# Patient Record
Sex: MALE | HISPANIC OR LATINO | Employment: FULL TIME | URBAN - METROPOLITAN AREA
[De-identification: names, ages, dates, MRNs, and addresses within clinical notes are randomized per-mention and may not be internally consistent; named-entity substitution may affect disease eponyms.]

---

## 2024-01-31 ENCOUNTER — APPOINTMENT (OUTPATIENT)
Dept: RADIOLOGY | Facility: MEDICAL CENTER | Age: 29
End: 2024-01-31
Attending: EMERGENCY MEDICINE

## 2024-01-31 ENCOUNTER — HOSPITAL ENCOUNTER (EMERGENCY)
Facility: MEDICAL CENTER | Age: 29
End: 2024-01-31
Attending: EMERGENCY MEDICINE

## 2024-01-31 VITALS
HEART RATE: 86 BPM | SYSTOLIC BLOOD PRESSURE: 162 MMHG | BODY MASS INDEX: 46.95 KG/M2 | WEIGHT: 264.99 LBS | HEIGHT: 63 IN | DIASTOLIC BLOOD PRESSURE: 85 MMHG | RESPIRATION RATE: 20 BRPM | TEMPERATURE: 99.1 F | OXYGEN SATURATION: 93 %

## 2024-01-31 DIAGNOSIS — K57.32 SIGMOID DIVERTICULITIS: ICD-10-CM

## 2024-01-31 LAB
ALBUMIN SERPL BCP-MCNC: 4.2 G/DL (ref 3.2–4.9)
ALBUMIN/GLOB SERPL: 1 G/DL
ALP SERPL-CCNC: 92 U/L (ref 30–99)
ALT SERPL-CCNC: 26 U/L (ref 2–50)
ANION GAP SERPL CALC-SCNC: 13 MMOL/L (ref 7–16)
APPEARANCE UR: CLEAR
AST SERPL-CCNC: 19 U/L (ref 12–45)
BACTERIA #/AREA URNS HPF: NEGATIVE /HPF
BASOPHILS # BLD AUTO: 0.3 % (ref 0–1.8)
BASOPHILS # BLD: 0.05 K/UL (ref 0–0.12)
BILIRUB SERPL-MCNC: 1 MG/DL (ref 0.1–1.5)
BILIRUB UR QL STRIP.AUTO: NEGATIVE
BUN SERPL-MCNC: 7 MG/DL (ref 8–22)
CALCIUM ALBUM COR SERPL-MCNC: 9.1 MG/DL (ref 8.5–10.5)
CALCIUM SERPL-MCNC: 9.3 MG/DL (ref 8.5–10.5)
CHLORIDE SERPL-SCNC: 102 MMOL/L (ref 96–112)
CO2 SERPL-SCNC: 21 MMOL/L (ref 20–33)
COLOR UR: ABNORMAL
CREAT SERPL-MCNC: 0.66 MG/DL (ref 0.5–1.4)
EOSINOPHIL # BLD AUTO: 0.01 K/UL (ref 0–0.51)
EOSINOPHIL NFR BLD: 0.1 % (ref 0–6.9)
EPI CELLS #/AREA URNS HPF: NEGATIVE /HPF
ERYTHROCYTE [DISTWIDTH] IN BLOOD BY AUTOMATED COUNT: 43.8 FL (ref 35.9–50)
GFR SERPLBLD CREATININE-BSD FMLA CKD-EPI: 129 ML/MIN/1.73 M 2
GLOBULIN SER CALC-MCNC: 4.2 G/DL (ref 1.9–3.5)
GLUCOSE SERPL-MCNC: 109 MG/DL (ref 65–99)
GLUCOSE UR STRIP.AUTO-MCNC: NEGATIVE MG/DL
HCT VFR BLD AUTO: 51.9 % (ref 42–52)
HGB BLD-MCNC: 17.9 G/DL (ref 14–18)
HYALINE CASTS #/AREA URNS LPF: ABNORMAL /LPF
IMM GRANULOCYTES # BLD AUTO: 0.13 K/UL (ref 0–0.11)
IMM GRANULOCYTES NFR BLD AUTO: 0.7 % (ref 0–0.9)
KETONES UR STRIP.AUTO-MCNC: 40 MG/DL
LEUKOCYTE ESTERASE UR QL STRIP.AUTO: NEGATIVE
LIPASE SERPL-CCNC: 14 U/L (ref 11–82)
LYMPHOCYTES # BLD AUTO: 1.26 K/UL (ref 1–4.8)
LYMPHOCYTES NFR BLD: 6.8 % (ref 22–41)
MCH RBC QN AUTO: 30.8 PG (ref 27–33)
MCHC RBC AUTO-ENTMCNC: 34.5 G/DL (ref 32.3–36.5)
MCV RBC AUTO: 89.2 FL (ref 81.4–97.8)
MICRO URNS: ABNORMAL
MONOCYTES # BLD AUTO: 1.4 K/UL (ref 0–0.85)
MONOCYTES NFR BLD AUTO: 7.6 % (ref 0–13.4)
NEUTROPHILS # BLD AUTO: 15.69 K/UL (ref 1.82–7.42)
NEUTROPHILS NFR BLD: 84.5 % (ref 44–72)
NITRITE UR QL STRIP.AUTO: NEGATIVE
NRBC # BLD AUTO: 0 K/UL
NRBC BLD-RTO: 0 /100 WBC (ref 0–0.2)
PH UR STRIP.AUTO: 6.5 [PH] (ref 5–8)
PLATELET # BLD AUTO: 297 K/UL (ref 164–446)
PMV BLD AUTO: 9.2 FL (ref 9–12.9)
POTASSIUM SERPL-SCNC: 3.8 MMOL/L (ref 3.6–5.5)
PROT SERPL-MCNC: 8.4 G/DL (ref 6–8.2)
PROT UR QL STRIP: NEGATIVE MG/DL
RBC # BLD AUTO: 5.82 M/UL (ref 4.7–6.1)
RBC # URNS HPF: ABNORMAL /HPF
RBC UR QL AUTO: ABNORMAL
SODIUM SERPL-SCNC: 136 MMOL/L (ref 135–145)
SP GR UR STRIP.AUTO: 1.02
UROBILINOGEN UR STRIP.AUTO-MCNC: 1 MG/DL
WBC # BLD AUTO: 18.5 K/UL (ref 4.8–10.8)
WBC #/AREA URNS HPF: ABNORMAL /HPF

## 2024-01-31 PROCEDURE — 80053 COMPREHEN METABOLIC PANEL: CPT

## 2024-01-31 PROCEDURE — 700117 HCHG RX CONTRAST REV CODE 255: Performed by: EMERGENCY MEDICINE

## 2024-01-31 PROCEDURE — 700102 HCHG RX REV CODE 250 W/ 637 OVERRIDE(OP): Performed by: EMERGENCY MEDICINE

## 2024-01-31 PROCEDURE — 99285 EMERGENCY DEPT VISIT HI MDM: CPT

## 2024-01-31 PROCEDURE — 81001 URINALYSIS AUTO W/SCOPE: CPT

## 2024-01-31 PROCEDURE — 83690 ASSAY OF LIPASE: CPT

## 2024-01-31 PROCEDURE — A9270 NON-COVERED ITEM OR SERVICE: HCPCS | Performed by: EMERGENCY MEDICINE

## 2024-01-31 PROCEDURE — 85025 COMPLETE CBC W/AUTO DIFF WBC: CPT

## 2024-01-31 PROCEDURE — 700111 HCHG RX REV CODE 636 W/ 250 OVERRIDE (IP): Mod: JZ | Performed by: EMERGENCY MEDICINE

## 2024-01-31 PROCEDURE — 96374 THER/PROPH/DIAG INJ IV PUSH: CPT

## 2024-01-31 PROCEDURE — 96375 TX/PRO/DX INJ NEW DRUG ADDON: CPT

## 2024-01-31 PROCEDURE — 74177 CT ABD & PELVIS W/CONTRAST: CPT

## 2024-01-31 RX ORDER — MORPHINE SULFATE 4 MG/ML
4 INJECTION INTRAVENOUS ONCE
Status: COMPLETED | OUTPATIENT
Start: 2024-01-31 | End: 2024-01-31

## 2024-01-31 RX ORDER — MORPHINE SULFATE 15 MG/1
7.5 TABLET ORAL EVERY 6 HOURS PRN
Qty: 6 TABLET | Refills: 0 | Status: SHIPPED | OUTPATIENT
Start: 2024-01-31 | End: 2024-02-03

## 2024-01-31 RX ORDER — HYDROCODONE BITARTRATE AND ACETAMINOPHEN 5; 325 MG/1; MG/1
1 TABLET ORAL ONCE
Status: COMPLETED | OUTPATIENT
Start: 2024-01-31 | End: 2024-01-31

## 2024-01-31 RX ORDER — DOCUSATE SODIUM 100 MG/1
100 CAPSULE, LIQUID FILLED ORAL 2 TIMES DAILY
Qty: 60 CAPSULE | Refills: 0 | Status: SHIPPED | OUTPATIENT
Start: 2024-01-31

## 2024-01-31 RX ORDER — AMOXICILLIN AND CLAVULANATE POTASSIUM 875; 125 MG/1; MG/1
1 TABLET, FILM COATED ORAL ONCE
Status: COMPLETED | OUTPATIENT
Start: 2024-01-31 | End: 2024-01-31

## 2024-01-31 RX ORDER — ONDANSETRON 2 MG/ML
4 INJECTION INTRAMUSCULAR; INTRAVENOUS ONCE
Status: COMPLETED | OUTPATIENT
Start: 2024-01-31 | End: 2024-01-31

## 2024-01-31 RX ORDER — AMOXICILLIN AND CLAVULANATE POTASSIUM 875; 125 MG/1; MG/1
1 TABLET, FILM COATED ORAL 2 TIMES DAILY
Qty: 14 TABLET | Refills: 0 | Status: ACTIVE | OUTPATIENT
Start: 2024-01-31 | End: 2024-02-07

## 2024-01-31 RX ADMIN — ONDANSETRON 4 MG: 2 INJECTION INTRAMUSCULAR; INTRAVENOUS at 20:41

## 2024-01-31 RX ADMIN — IOHEXOL 100 ML: 350 INJECTION, SOLUTION INTRAVENOUS at 21:21

## 2024-01-31 RX ADMIN — MORPHINE SULFATE 4 MG: 4 INJECTION, SOLUTION INTRAMUSCULAR; INTRAVENOUS at 20:42

## 2024-01-31 RX ADMIN — AMOXICILLIN AND CLAVULANATE POTASSIUM 1 TABLET: 875; 125 TABLET, FILM COATED ORAL at 22:09

## 2024-01-31 RX ADMIN — HYDROCODONE BITARTRATE AND ACETAMINOPHEN 1 TABLET: 5; 325 TABLET ORAL at 22:08

## 2024-02-01 NOTE — ED TRIAGE NOTES
Chief Complaint   Patient presents with    Abdominal Pain     Reports pain from umbilicus radiating down to left groin since yesterday. States progressively worsening. Denies redness or swelling. Reports pain with urination. Denies blood in urine.      Pt ambulates to triage for above complaints.     Pt in nad, VSS. Protocol initiated. Pt to anthony, educated on triage process, thanked for patience.

## 2024-02-01 NOTE — ED NOTES
"Pt discharged home with friend. The pt is alert and oriented, calm and cooperative, talks with clear coherent speech, ambulates with a steady gait, and moves extremities. The pt reports a pain reduction. Vitals stable on the monitor. IV discontinued and gauze placed, pt in possession of belongings. Pt provided discharge education and information pertaining to medications and follow up appointments. Pt received copy of discharge instructions and verbalized understanding. BP (!) 162/85   Pulse 86   Temp 37.3 °C (99.1 °F) (Temporal)   Resp 20   Ht 1.6 m (5' 3\")   Wt 120 kg (264 lb 15.9 oz)   SpO2 93%   BMI 46.94 kg/m²     "

## 2024-02-01 NOTE — DISCHARGE INSTRUCTIONS
Do not drive, operate any machinery, or partake in dangerous activities that require maximum physical and mental performance while taking the prescribed pain killer. Do not combine this drug with alcohol or other sedatives or narcotics. Follow up with your primary care doctor in regards to the future management of this medication.

## 2024-02-01 NOTE — ED PROVIDER NOTES
"ED Provider Note    CHIEF COMPLAINT  Chief Complaint   Patient presents with    Abdominal Pain     Reports pain from umbilicus radiating down to left groin since yesterday. States progressively worsening. Denies redness or swelling. Reports pain with urination. Denies blood in urine.          HPI/ROS  LIMITATION TO HISTORY   Select: : None  OUTSIDE HISTORIAN(S):  cipriano    Gucci Bucio is a 28 y.o. male who presents to the emerged department chief complaint of 2 to 3 days of worsening abdominal pain.  He states it kind of started out as a mild ache in his suprapubic region and pain when he peed radiating to his testicles but now its part is really just worsened in his abdominal area.  He states that still kind of radiates to his penis in his testicles but mostly is abdominal discomfort.  Associated with nausea and chills but no fevers no actual vomiting.  A little bit of constipation but no bloody stools or bloody emesis.  No actual dysuria it just hurts more in his belly but no penile pain no testicular swelling that he has noted. he is not sexually active    PAST MEDICAL HISTORY       SURGICAL HISTORY  patient denies any surgical history    FAMILY HISTORY  History reviewed. No pertinent family history.    SOCIAL HISTORY  Social History     Tobacco Use    Smoking status: Never    Smokeless tobacco: Never   Substance and Sexual Activity    Alcohol use: Yes     Comment: occ    Drug use: Yes     Types: Inhaled     Comment: marijuana    Sexual activity: Not on file       CURRENT MEDICATIONS  Home Medications       Reviewed by Ave Villanueva R.N. (Registered Nurse) on 01/31/24 at 1753  Med List Status: Partial     Medication Last Dose Status        Patient Nicolás Taking any Medications                           ALLERGIES  No Known Allergies    PHYSICAL EXAM  VITAL SIGNS: BP (!) 151/93   Pulse 100   Temp 35.9 °C (96.6 °F) (Temporal)   Resp 16   Ht 1.6 m (5' 3\")   Wt 120 kg (264 lb 15.9 oz)   SpO2 97%   BMI " 46.94 kg/m²    Pulse OX: Pulse Oxygen level is normal  Constitutional: Alert in no apparent distress.  Base male  HENT: Normocephalic, Atraumatic, MMM  Eyes: PERound. Conjunctiva normal, non-icteric.   Heart: Regular rate and rhythm, intact distal pulses   Lungs: Symmetrical movement, no resp distress   Abdomen: Significant tenderness suprapubic and right lower quadrant with localized guarding non-distended, normal bowel sounds  : Uncircumcised male foreskin is retracted easily no phimosis no paraphimosis no erythema no warmth no rash bilateral descended testicles without swelling edema or tenderness  Skin: Warm, Dry, No erythema, No rash.   Neurologic: Alert and oriented, Grossly non-focal.       DIAGNOSTIC STUDIES / PROCEDURES    LABS  Labs Reviewed   CBC WITH DIFFERENTIAL - Abnormal; Notable for the following components:       Result Value    WBC 18.5 (*)     Neutrophils-Polys 84.50 (*)     Lymphocytes 6.80 (*)     Neutrophils (Absolute) 15.69 (*)     Monos (Absolute) 1.40 (*)     Immature Granulocytes (abs) 0.13 (*)     All other components within normal limits   COMP METABOLIC PANEL - Abnormal; Notable for the following components:    Glucose 109 (*)     Bun 7 (*)     Total Protein 8.4 (*)     Globulin 4.2 (*)     All other components within normal limits   URINALYSIS - Abnormal; Notable for the following components:    Ketones 40 (*)     Occult Blood Trace (*)     All other components within normal limits   URINE MICROSCOPIC (W/UA) - Abnormal; Notable for the following components:    WBC 0-2 (*)     RBC 5-10 (*)     All other components within normal limits   LIPASE   ESTIMATED GFR         RADIOLOGY  I have independently interpreted the diagnostic imaging associated with this visit and am waiting the final reading from the radiologist.   My preliminary interpretation is as follows:     Ct abd pelvis -no evidence appendicitis but appears to be diverticulitis without free air or free fluid or  "abscess    Radiologist interpretation:     CT-ABDOMEN-PELVIS WITH   Final Result      Acute sigmoid diverticulitis without gross perforation.            COURSE & MEDICAL DECISION MAKING    ED Observation Status? No; Patient does not meet criteria for ED Observation.     INITIAL ASSESSMENT, COURSE AND PLAN  Care Narrative:     Patient is a 28-year-old male presents emerged apartment with was was complaining of testicular and penile pain which actually turns out to be abdominal pain and localized guarding in the abdomen with worsening pain over the last 2 days and nausea with decreased appetite concerning for appendicitis versus atypical diverticulitis.  I low concern for UTI a urinalysis was obtained and only had trace hematuria I doubt pyelonephritis I doubt renal stone.  His physical examination of his  was unremarkable.  The plan is for CT of the abdomen pelvis with pain management morphine was provided as well as Zofran      DISPOSITION AND DISCUSSIONS  9:36 PM  I reassessed patient at the bedside.  We discussed his diagnosis of diverticulitis in the sigmoid region without perforation or abscess.  He will be sent home on oral pain meds as well as oral antibiotics.  Was given the first dose of antibiotics here in the ED.  Gentle liquid diet for the next 2 days and strict return precautions for any new or worsening issues including vomiting and inability to tolerate oral antibiotics    BP (!) 162/85   Pulse 86   Temp 37.3 °C (99.1 °F) (Temporal)   Resp 20   Ht 1.6 m (5' 3\")   Wt 120 kg (264 lb 15.9 oz)   SpO2 93%   BMI 46.94 kg/m²     In prescribing controlled substances to this patient, I certify that I have obtained and reviewed the medical history of Gucci Bucio. I have also made a good diane effort to obtain applicable records from other providers who have treated the patient and records did not demonstrate any increased risk of substance abuse that would prevent me from prescribing " controlled substances.     I have conducted a physical exam and documented it. I have reviewed Mr. Frances Bucio’s prescription history as maintained by the [place] Prescription Monitoring Program.     I have assessed the patient’s risk for abuse, dependency, and addiction using the validated Opioid Risk Tool available at https://www.mdcalc.com/bfddzr-htfg-oovs-ort-narcotic-abuse. 0    Given the above, I believe the benefits of controlled substance therapy outweigh the risks. The reasons for prescribing controlled substances include non-narcotic, oral analgesic alternatives have been inadequate for pain control. Accordingly, I have discussed the risk and benefits, treatment plan, and alternative therapies with the patient.       I have discussed management of the patient with the following physicians and ADEBAYO's:  none    Discussion of management with other [facility] or appropriate source(s): None     Escalation of care considered, and ultimately not performed:acute inpatient care management, however at this time, the patient is most appropriate for outpatient management    Barriers to care at this time, including but not limited to:  na .     Decision tools and prescription drugs considered including, but not limited to: Antibiotics were prescribed and Pain Medications were prescribed .    The patient will return for new or worsening symptoms and is stable at the time of discharge.    The patient is referred to a primary physician for blood pressure management, diabetic screening, and for all other preventative health concerns.    DISPOSITION:  Patient will be discharged home in stable condition.    FOLLOW UP:  [facility], Emergency Dept  [address] [address] [phone]    If symptoms worsen      OUTPATIENT MEDICATIONS:  Discharge Medication List as of 1/31/2024 10:25 PM        START taking these medications    Details   amoxicillin-clavulanate (AUGMENTIN) 875-125 MG Tab Take 1  Tablet by mouth 2 times a day for 7 days., Disp-14 Tablet, R-0, Normal      morphine (MS IR) 15 MG tablet Take 0.5 Tablets by mouth every 6 hours as needed for Severe Pain for up to 3 days., Disp-6 Tablet, R-0, Normal      docusate sodium (COLACE) 100 MG Cap Take 1 Capsule by mouth 2 times a day., Disp-60 Capsule, R-0, Normal               FINAL DIAGNOSIS  1. Sigmoid diverticulitis           Electronically signed by: Eloina Verdugo M.D., 1/31/2024 8:13 PM

## 2024-02-01 NOTE — ED NOTES
The pt is alert and oriented sitting in bed. The pt reports pain reduction after medicating per mar. Vitals stable on the monitor

## 2024-02-01 NOTE — ED NOTES
The pt is alert and oriented. The pt ambulated with a steady gait to the room. The pt reports pain. The pt changed and RN hooked up the pt to the monitor.

## 2024-11-29 ENCOUNTER — APPOINTMENT (OUTPATIENT)
Dept: RADIOLOGY | Facility: MEDICAL CENTER | Age: 29
End: 2024-11-29
Attending: EMERGENCY MEDICINE

## 2024-11-29 ENCOUNTER — HOSPITAL ENCOUNTER (EMERGENCY)
Facility: MEDICAL CENTER | Age: 29
End: 2024-11-30
Attending: EMERGENCY MEDICINE

## 2024-11-29 VITALS
OXYGEN SATURATION: 96 % | RESPIRATION RATE: 16 BRPM | WEIGHT: 227.07 LBS | BODY MASS INDEX: 38.77 KG/M2 | HEART RATE: 65 BPM | DIASTOLIC BLOOD PRESSURE: 70 MMHG | HEIGHT: 64 IN | SYSTOLIC BLOOD PRESSURE: 157 MMHG | TEMPERATURE: 97.4 F

## 2024-11-29 DIAGNOSIS — N20.1 URETEROLITHIASIS: ICD-10-CM

## 2024-11-29 DIAGNOSIS — R10.9 FLANK PAIN: ICD-10-CM

## 2024-11-29 LAB
ALBUMIN SERPL BCP-MCNC: 4.6 G/DL (ref 3.2–4.9)
ALBUMIN/GLOB SERPL: 1.4 G/DL
ALP SERPL-CCNC: 81 U/L (ref 30–99)
ALT SERPL-CCNC: 17 U/L (ref 2–50)
ANION GAP SERPL CALC-SCNC: 17 MMOL/L (ref 7–16)
APPEARANCE UR: ABNORMAL
AST SERPL-CCNC: 20 U/L (ref 12–45)
BACTERIA #/AREA URNS HPF: ABNORMAL /HPF
BASOPHILS # BLD AUTO: 0.5 % (ref 0–1.8)
BASOPHILS # BLD: 0.07 K/UL (ref 0–0.12)
BILIRUB SERPL-MCNC: 0.5 MG/DL (ref 0.1–1.5)
BILIRUB UR QL STRIP.AUTO: NEGATIVE
BUN SERPL-MCNC: 13 MG/DL (ref 8–22)
CALCIUM ALBUM COR SERPL-MCNC: 8.9 MG/DL (ref 8.5–10.5)
CALCIUM SERPL-MCNC: 9.4 MG/DL (ref 8.5–10.5)
CASTS URNS QL MICRO: ABNORMAL /LPF (ref 0–2)
CHLORIDE SERPL-SCNC: 101 MMOL/L (ref 96–112)
CO2 SERPL-SCNC: 21 MMOL/L (ref 20–33)
COLOR UR: ABNORMAL
CREAT SERPL-MCNC: 0.93 MG/DL (ref 0.5–1.4)
EOSINOPHIL # BLD AUTO: 0.18 K/UL (ref 0–0.51)
EOSINOPHIL NFR BLD: 1.3 % (ref 0–6.9)
EPITHELIAL CELLS 1715: ABNORMAL /HPF (ref 0–5)
ERYTHROCYTE [DISTWIDTH] IN BLOOD BY AUTOMATED COUNT: 42.6 FL (ref 35.9–50)
GFR SERPLBLD CREATININE-BSD FMLA CKD-EPI: 112 ML/MIN/1.73 M 2
GLOBULIN SER CALC-MCNC: 3.4 G/DL (ref 1.9–3.5)
GLUCOSE SERPL-MCNC: 130 MG/DL (ref 65–99)
GLUCOSE UR STRIP.AUTO-MCNC: NEGATIVE MG/DL
HCT VFR BLD AUTO: 49.3 % (ref 42–52)
HGB BLD-MCNC: 16.9 G/DL (ref 14–18)
HYALINE CAST   1831: PRESENT /LPF
IMM GRANULOCYTES # BLD AUTO: 0.1 K/UL (ref 0–0.11)
IMM GRANULOCYTES NFR BLD AUTO: 0.7 % (ref 0–0.9)
KETONES UR STRIP.AUTO-MCNC: 15 MG/DL
LEUKOCYTE ESTERASE UR QL STRIP.AUTO: ABNORMAL
LIPASE SERPL-CCNC: 23 U/L (ref 11–82)
LYMPHOCYTES # BLD AUTO: 1.22 K/UL (ref 1–4.8)
LYMPHOCYTES NFR BLD: 8.6 % (ref 22–41)
MCH RBC QN AUTO: 29.9 PG (ref 27–33)
MCHC RBC AUTO-ENTMCNC: 34.3 G/DL (ref 32.3–36.5)
MCV RBC AUTO: 87.3 FL (ref 81.4–97.8)
MICRO URNS: ABNORMAL
MONOCYTES # BLD AUTO: 0.8 K/UL (ref 0–0.85)
MONOCYTES NFR BLD AUTO: 5.7 % (ref 0–13.4)
NEUTROPHILS # BLD AUTO: 11.74 K/UL (ref 1.82–7.42)
NEUTROPHILS NFR BLD: 83.2 % (ref 44–72)
NITRITE UR QL STRIP.AUTO: NEGATIVE
NRBC # BLD AUTO: 0 K/UL
NRBC BLD-RTO: 0 /100 WBC (ref 0–0.2)
PH UR STRIP.AUTO: 6 [PH] (ref 5–8)
PLATELET # BLD AUTO: 325 K/UL (ref 164–446)
PMV BLD AUTO: 9.2 FL (ref 9–12.9)
POTASSIUM SERPL-SCNC: 3.8 MMOL/L (ref 3.6–5.5)
PROT SERPL-MCNC: 8 G/DL (ref 6–8.2)
PROT UR QL STRIP: 100 MG/DL
RBC # BLD AUTO: 5.65 M/UL (ref 4.7–6.1)
RBC # URNS HPF: >100 /HPF (ref 0–2)
RBC UR QL AUTO: ABNORMAL
SODIUM SERPL-SCNC: 139 MMOL/L (ref 135–145)
SP GR UR STRIP.AUTO: 1.03
UROBILINOGEN UR STRIP.AUTO-MCNC: 1 EU/DL
WBC # BLD AUTO: 14.1 K/UL (ref 4.8–10.8)
WBC #/AREA URNS HPF: ABNORMAL /HPF

## 2024-11-29 PROCEDURE — 36415 COLL VENOUS BLD VENIPUNCTURE: CPT

## 2024-11-29 PROCEDURE — RXMED WILLOW AMBULATORY MEDICATION CHARGE: Performed by: EMERGENCY MEDICINE

## 2024-11-29 PROCEDURE — 83690 ASSAY OF LIPASE: CPT

## 2024-11-29 PROCEDURE — 700102 HCHG RX REV CODE 250 W/ 637 OVERRIDE(OP): Performed by: EMERGENCY MEDICINE

## 2024-11-29 PROCEDURE — 99284 EMERGENCY DEPT VISIT MOD MDM: CPT

## 2024-11-29 PROCEDURE — A9270 NON-COVERED ITEM OR SERVICE: HCPCS | Performed by: EMERGENCY MEDICINE

## 2024-11-29 PROCEDURE — 96374 THER/PROPH/DIAG INJ IV PUSH: CPT

## 2024-11-29 PROCEDURE — 81001 URINALYSIS AUTO W/SCOPE: CPT

## 2024-11-29 PROCEDURE — 74176 CT ABD & PELVIS W/O CONTRAST: CPT

## 2024-11-29 PROCEDURE — 96375 TX/PRO/DX INJ NEW DRUG ADDON: CPT

## 2024-11-29 PROCEDURE — 85025 COMPLETE CBC W/AUTO DIFF WBC: CPT

## 2024-11-29 PROCEDURE — 700111 HCHG RX REV CODE 636 W/ 250 OVERRIDE (IP): Mod: JZ | Performed by: EMERGENCY MEDICINE

## 2024-11-29 PROCEDURE — 80053 COMPREHEN METABOLIC PANEL: CPT

## 2024-11-29 RX ORDER — MORPHINE SULFATE 4 MG/ML
4 INJECTION INTRAVENOUS ONCE
Status: COMPLETED | OUTPATIENT
Start: 2024-11-29 | End: 2024-11-29

## 2024-11-29 RX ORDER — ACETAMINOPHEN 500 MG
1000 TABLET ORAL ONCE
Status: COMPLETED | OUTPATIENT
Start: 2024-11-30 | End: 2024-11-29

## 2024-11-29 RX ORDER — KETOROLAC TROMETHAMINE 15 MG/ML
15 INJECTION, SOLUTION INTRAMUSCULAR; INTRAVENOUS ONCE
Status: COMPLETED | OUTPATIENT
Start: 2024-11-30 | End: 2024-11-29

## 2024-11-29 RX ORDER — ONDANSETRON 4 MG/1
4 TABLET, ORALLY DISINTEGRATING ORAL EVERY 6 HOURS PRN
Qty: 15 TABLET | Refills: 0 | Status: SHIPPED | OUTPATIENT
Start: 2024-11-29 | End: 2024-12-06

## 2024-11-29 RX ORDER — ACETAMINOPHEN 500 MG
1000 TABLET ORAL EVERY 6 HOURS PRN
Qty: 40 TABLET | Refills: 0 | Status: SHIPPED | OUTPATIENT
Start: 2024-11-29 | End: 2024-12-09

## 2024-11-29 RX ORDER — IBUPROFEN 600 MG/1
600 TABLET, FILM COATED ORAL EVERY 6 HOURS PRN
Qty: 40 TABLET | Refills: 0 | Status: SHIPPED | OUTPATIENT
Start: 2024-11-29 | End: 2024-12-10

## 2024-11-29 RX ORDER — ONDANSETRON 2 MG/ML
4 INJECTION INTRAMUSCULAR; INTRAVENOUS ONCE
Status: COMPLETED | OUTPATIENT
Start: 2024-11-29 | End: 2024-11-29

## 2024-11-29 RX ORDER — TAMSULOSIN HYDROCHLORIDE 0.4 MG/1
0.4 CAPSULE ORAL DAILY
Qty: 10 CAPSULE | Refills: 0 | Status: SHIPPED | OUTPATIENT
Start: 2024-11-29 | End: 2024-12-10

## 2024-11-29 RX ORDER — OXYCODONE HYDROCHLORIDE 5 MG/1
5 TABLET ORAL EVERY 6 HOURS PRN
Qty: 12 TABLET | Refills: 0 | Status: SHIPPED | OUTPATIENT
Start: 2024-11-29 | End: 2024-12-07

## 2024-11-29 RX ORDER — TAMSULOSIN HYDROCHLORIDE 0.4 MG/1
0.4 CAPSULE ORAL ONCE
Status: COMPLETED | OUTPATIENT
Start: 2024-11-30 | End: 2024-11-29

## 2024-11-29 RX ADMIN — MORPHINE SULFATE 4 MG: 4 INJECTION, SOLUTION INTRAMUSCULAR; INTRAVENOUS at 22:12

## 2024-11-29 RX ADMIN — ONDANSETRON 4 MG: 2 INJECTION INTRAMUSCULAR; INTRAVENOUS at 22:13

## 2024-11-29 RX ADMIN — KETOROLAC TROMETHAMINE 15 MG: 15 INJECTION, SOLUTION INTRAMUSCULAR; INTRAVENOUS at 23:42

## 2024-11-29 RX ADMIN — TAMSULOSIN HYDROCHLORIDE 0.4 MG: 0.4 CAPSULE ORAL at 23:45

## 2024-11-29 RX ADMIN — ACETAMINOPHEN 1000 MG: 500 TABLET ORAL at 23:44

## 2024-11-29 ASSESSMENT — FIBROSIS 4 INDEX: FIB4 SCORE: 0.39

## 2024-11-29 ASSESSMENT — PAIN DESCRIPTION - PAIN TYPE
TYPE: ACUTE PAIN

## 2024-11-30 ENCOUNTER — PHARMACY VISIT (OUTPATIENT)
Dept: PHARMACY | Facility: MEDICAL CENTER | Age: 29
End: 2024-11-30
Payer: COMMERCIAL

## 2024-11-30 PROCEDURE — 99284 EMERGENCY DEPT VISIT MOD MDM: CPT

## 2024-11-30 PROCEDURE — 36415 COLL VENOUS BLD VENIPUNCTURE: CPT

## 2024-11-30 NOTE — ED NOTES
PIV removed, catheter intact. Discharge education provided by ERP. Discharge paperwork reviewed with pt. Controlled substance use information consent signed by pt. Prescription to be picked up by pt. All questions answered. All belongings with pt. Pt ambulated to lobby unassisted with steady gait.   41

## 2024-11-30 NOTE — ED TRIAGE NOTES
Gucci Bucio  29 y.o. male  Chief Complaint   Patient presents with    Flank Pain     Started about 1.5 hrs ago while watching TV, denies recent trauma, dysuria.      Pt ambulatory to triage with steady gait for above complaint.     Pt is GCS 15, speaking in full sentences, follows commands and responds appropriately to questions. Resp are even and unlabored.     Pt placed in ED lobby. Pt educated on triage process. Pt encouraged to alert staff for any changes.       Vitals:    11/29/24 2123   BP: (!) 155/88   Pulse: (!) 58   Resp: 13   Temp: 36.4 °C (97.6 °F)   SpO2: 100%

## 2024-11-30 NOTE — ED PROVIDER NOTES
"ED Provider Note    CHIEF COMPLAINT  Chief Complaint   Patient presents with    Flank Pain     Started about 1.5 hrs ago while watching TV, denies recent trauma, dysuria.        EXTERNAL RECORDS REVIEWED  Other ED records reviewed: Patient was last seen in the emergency department January 2020 for with abdominal pain, found to have diverticulitis without complication.  He was discharged on Augmentin.    HPI/ROS  LIMITATION TO HISTORY   Select: : None  OUTSIDE HISTORIAN(S):  None    LuisKang Bucio is a 29 y.o. male with history of diverticulitis who presents to the emergency department for evaluation of sudden onset of right flank pain.  Since pain onset, he has also had nausea and a few episodes of nonbilious/nonbloody vomiting.  No diarrhea, constipation, melena, bloody stools.  No dysuria or hematuria.  No prior abdominal surgeries.  No history of kidney stones.  No fevers or chills.    PAST MEDICAL HISTORY   Diverticulitis    SURGICAL HISTORY  patient denies any surgical history    FAMILY HISTORY  No family history on file.    SOCIAL HISTORY  Social History     Tobacco Use    Smoking status: Never    Smokeless tobacco: Never   Substance and Sexual Activity    Alcohol use: Yes     Comment: occ    Drug use: Yes     Types: Inhaled     Comment: marijuana    Sexual activity: Not on file       CURRENT MEDICATIONS  Home Medications       Reviewed by Ruperto Torres R.N. (Registered Nurse) on 11/29/24 at 2128  Med List Status: Partial     Medication Last Dose Status   docusate sodium (COLACE) 100 MG Cap  Active                  Audit from Redirected Encounters    **Home medications have not yet been reviewed for this encounter**       ALLERGIES  No Known Allergies    PHYSICAL EXAM  VITAL SIGNS: BP (!) 157/70   Pulse 65   Temp 36.3 °C (97.4 °F) (Temporal)   Resp 16   Ht 1.626 m (5' 4\")   Wt 103 kg (227 lb 1.2 oz)   SpO2 96%   BMI 38.98 kg/m²    General: Well-appearing, no acute distress.   Eyes: No " scleral icterus. EOM grossly intact BL.  HENT: MMM.   Neck: Normal ROM.   Lungs: Non-labored breathing.   Cardiac: Regular rate and rhythm.   Abdomen: Soft, non-tender, non-distended. No rebound or guarding. No RUQ or RLQ tenderness. No CVA tenderness.   MSK: Symmetric movement of all extremities.  Skin: No rashes, lesions, bruising, or petechiae. Well-perfused.   Neuro: Grossly nonfocal neurologic exam. Face symmetric. Normal mentation.     EKG/LABS  CBC - leukocytosis 14K, normal hemoglobin, normal platelets.  CMP - Normal renal function, nonactionable electrolytes, normal liver enzymes and lipase without an obstructive pattern.  Urinalysis shows greater than 100 WBCs/large occult blood, trace leukocyte esterase, negative nitrite, 0-2 WBCs.     RADIOLOGY/PROCEDURES   I have independently interpreted the diagnostic imaging associated with this visit and am waiting the final reading from the radiologist.     My preliminary interpretation is as follows:   CT renal:     Radiologist interpretation:  CT-RENAL COLIC EVALUATION(A/P W/O)   Final Result      3 mm RIGHT ureteropelvic junction stone with mild to moderate RIGHT hydronephrosis   LEFT calyceal stones        COURSE & MEDICAL DECISION MAKING    ASSESSMENT, COURSE AND PLAN  Care Narrative:   Gucci Bucio is a 29 y.o. male with history of diverticulitis who presents to the emergency department for evaluation of sudden onset of right flank pain.  On my exam, ABCs are intact.  Vital signs are within normal limits.  He is hemodynamically stable and afebrile.  He is overall well-appearing and nontoxic.  He is not writhing around in pain.  His abdomen is soft and nontender, no peritoneal signs, no CVA tenderness.  He maybe has some very subtle/mild right upper quadrant tenderness, however I am not convinced his symptoms are biliary in nature.  I am also not convinced he has a kidney stone.  Differential includes gastritis, pancreatitis, biliary obstruction,  cholelithiasis, cholecystitis, choledocholithiasis, nephroureterolithiasis, appendicitis.     IV access was established and labs were obtained.  CBC showed a leukocytosis of 14K, normal hemoglobin, normal platelet count.  CMP showed normal renal function, nonactionable electrolytes, normal liver enzymes and lipase without an obstructive pattern.  Urinalysis concerning for nephroureterolithiasis with large occult blood/>100 RBCs, trace leukocyte esterase, negative nitrate, 0-2 WBCs.  Urinalysis does not appear infected.    CT abdomen/pelvis without contrast showed a 3 mm right sided stone at the UPJ.    2330: On my reassessment, he reports an improvement in his symptoms.  I also ordered him Toradol, Tylenol, and Flomax.  I reviewed the results of the CT scan and lab studies with the patient.  I believe he is safe for discharge with a trial of outpatient passage.  I prescribed ibuprofen and Tylenol first-line for pain control, oxycodone for severe breakthrough pain, Zofran for vomiting, and Flomax.  Placed urology referral and provided him with the clinic information to call and schedule a follow-up if he does not hear from the clinic a referral coordinator.  I reviewed strict return precautions, all of his questions were answered, and he was discharged in stable condition.    ADDITIONAL PROBLEMS MANAGED  N/A    DISPOSITION AND DISCUSSIONS  I have discussed management of the patient with the following physicians and ADEBAYO's:  None    Discussion of management with other [facility] or appropriate source(s): None     Escalation of care considered, and ultimately not performed:acute inpatient care management, however at this time, the patient is most appropriate for outpatient management    Barriers to care at this time, including but not limited to: Patient does not have established PCP.     Decision tools and prescription drugs considered including, but not limited to:  Tylenol, ibuprofen, oxycodone, Zofran, Flomax .    FINAL  DIAGNOSIS  1. Flank pain Acute   2. Ureterolithiasis Acute        Electronically signed by: Kathy Reid D.O., 11/29/2024 9:49 PM

## 2024-11-30 NOTE — DISCHARGE INSTRUCTIONS
You have a 3mm right-sided kidney stone at the junction where your ureter meets your kidney.  Typically with this size of kidney stone, we trial spontaneous passage of the stone.    You can use Tylenol and ibuprofen together at the same time every 6 hours first-line for pain control.  You can use oxycodone 1 tablet every 6 hours for severe breakthrough pain.  Oxycodone is a narcotic pain medication.  If you take oxycodone, you should not drive a vehicle.  You can use Zofran 1 tablet every 6-8 hours as needed for nausea.  Flomax will help relax your ureter, and you should take 1 tablet daily until you have passed the stone.  Ensure you stay well-hydrated.    If you develop any worsening symptoms, specifically fever, chills, worsening pain, inability to urinate, burning or pain with urination, please return to the emergency department to be reevaluated.    I placed a referral for you to see urology.  You should hear from the referral coordinator early next week, however if you have not heard from them by Tuesday, I would call the number provided and schedule follow-up.